# Patient Record
Sex: MALE | Race: WHITE
[De-identification: names, ages, dates, MRNs, and addresses within clinical notes are randomized per-mention and may not be internally consistent; named-entity substitution may affect disease eponyms.]

---

## 2017-07-24 ENCOUNTER — HOSPITAL ENCOUNTER (OUTPATIENT)
Dept: HOSPITAL 19 - COL.RAD | Age: 62
End: 2017-07-24
Attending: FAMILY MEDICINE
Payer: MEDICARE

## 2017-07-24 DIAGNOSIS — Z85.038: ICD-10-CM

## 2017-07-24 DIAGNOSIS — M19.019: Primary | ICD-10-CM

## 2017-07-24 DIAGNOSIS — M17.9: ICD-10-CM

## 2017-07-24 DIAGNOSIS — M19.049: ICD-10-CM

## 2017-07-24 PROCEDURE — A9503 TC99M MEDRONATE: HCPCS

## 2017-08-10 ENCOUNTER — HOSPITAL ENCOUNTER (OUTPATIENT)
Dept: HOSPITAL 19 - COL.RAD | Age: 62
End: 2017-08-10
Attending: FAMILY MEDICINE
Payer: MEDICARE

## 2017-08-10 VITALS — DIASTOLIC BLOOD PRESSURE: 80 MMHG | SYSTOLIC BLOOD PRESSURE: 156 MMHG | HEART RATE: 57 BPM

## 2017-08-10 VITALS — DIASTOLIC BLOOD PRESSURE: 79 MMHG | HEART RATE: 66 BPM | SYSTOLIC BLOOD PRESSURE: 143 MMHG

## 2017-08-10 VITALS — HEIGHT: 70 IN | WEIGHT: 181.88 LBS | BODY MASS INDEX: 26.04 KG/M2

## 2017-08-10 VITALS — HEART RATE: 58 BPM | DIASTOLIC BLOOD PRESSURE: 91 MMHG | SYSTOLIC BLOOD PRESSURE: 162 MMHG

## 2017-08-10 DIAGNOSIS — M54.5: Primary | ICD-10-CM

## 2017-08-10 DIAGNOSIS — G89.29: ICD-10-CM

## 2019-10-24 NOTE — NUR
SMALL AMT OF PURULENT DRAINAGED REMOVED AND SENT TO LAB. MONITORING EQUIPMENT
REMOVED. SITE IS CDI WITH BANDAIDE PRESENT. PT GETS OFF THE TABLE PER SELF AND
SITS IN WHEELCHAIR. NO MEDS GIVEN.